# Patient Record
Sex: FEMALE | Race: WHITE
[De-identification: names, ages, dates, MRNs, and addresses within clinical notes are randomized per-mention and may not be internally consistent; named-entity substitution may affect disease eponyms.]

---

## 2019-06-19 ENCOUNTER — HOSPITAL ENCOUNTER (EMERGENCY)
Dept: HOSPITAL 91 - FTE | Age: 39
Discharge: HOME | End: 2019-06-19
Payer: MEDICAID

## 2019-06-19 ENCOUNTER — HOSPITAL ENCOUNTER (EMERGENCY)
Dept: HOSPITAL 10 - FTE | Age: 39
Discharge: HOME | End: 2019-06-19
Payer: MEDICAID

## 2019-06-19 VITALS
HEIGHT: 61 IN | BODY MASS INDEX: 29.93 KG/M2 | HEIGHT: 61 IN | WEIGHT: 158.51 LBS | BODY MASS INDEX: 29.93 KG/M2 | WEIGHT: 158.51 LBS

## 2019-06-19 VITALS — SYSTOLIC BLOOD PRESSURE: 129 MMHG | DIASTOLIC BLOOD PRESSURE: 93 MMHG | RESPIRATION RATE: 18 BRPM | HEART RATE: 91 BPM

## 2019-06-19 DIAGNOSIS — H57.89: Primary | ICD-10-CM

## 2019-06-19 DIAGNOSIS — I10: ICD-10-CM

## 2019-06-19 PROCEDURE — 99282 EMERGENCY DEPT VISIT SF MDM: CPT

## 2019-06-19 NOTE — ERD
ER Documentation


Chief Complaint


Chief Complaint





RIGHT EYE REDNESS; HEADACHE;TINGLING SENSATION ON THE LEFT SIDE OF THE BODY





HPI


39-year-old female with no reported past medical history who presents with 


complaint of right eye redness and left-sided headache.  Patient states she woke


up and noticed sclera via with prominent redness.  Also with left-sided 


headache.  She denies recent trauma to right eye, prominent cough, blurry 


vision, recent URI type symptoms.  Headache is gradual onset without associated 


dizziness, nausea, vomiting, blurry vision.  Time examination patient nontoxic-


appearing with normal triage vital signs.  She otherwise is without complaint.





ROS


All systems reviewed and are negative except as per history of present illness.





Medications


Home Meds


Active Scripts


Ibuprofen* (Motrin*) 600 Mg Tab, 600 MG PO Q6, #30 TAB


   Prov:ELI OTOOLE PA-C         19


Naproxen* (Naprosyn*) 500 Mg Tablet, 500 MG PO BID PRN for PAIN AND/OR 


INFLAMMATION, #30 TAB


   Prov:ELI OTOOLE PA-C         19


Polyvinyl Alcohol/Povidone (Artificial Tears Drops) 15 Ml Drops, 15 ML OP Q2H 


for 10 Days, BOTTLE


   Prov:ELI OTOOLE PA-C         19


Diphenhydramine Hcl* (Benadryl*) 25 Mg Cap, 25 MG PO Q6, #14 CAP


   Prov:ELI OTOOLE PA-C         19


Ondansetron (Ondansetron Odt) 4 Mg Tab.rapdis, 4 MG PO Q6H PRN for NAUSEA AND/OR


VOMITING, #10 TAB


   Prov:SYBIL,LINDA         18


Hydrocodone/Acetaminophen (Norco 5-325 Tablet) 1 Each Tablet, 1 TAB PO Q6H PRN 


for PAIN, #20 TAB


   Prov:SYBIL,LINDA         18


Ondansetron Hcl* (Zofran*) 4 Mg Tablet, 4 MG PO Q6H for NAUSEA AND/OR VOMITING, 


#30 TAB


   Prov:Felicia Walls PA-C         10/13/16


Hydrocodone/Acetaminophen (Norco 5-325 Tablet) 1 Each Tablet, 1 TAB PO Q6H PRN 


for PAIN, #14 TAB


   Prov:Felicia Walls PA-C         10/13/16





Allergies


Allergies:  


Coded Allergies:  


     Penicillins (Verified  Allergy, rash, 13)





PMhx/Soc


History of Surgery:  Yes ( X 4)


Anesthesia Reaction:  No


Hx Neurological Disorder:  No


Hx Respiratory Disorders:  No


Hx Cardiac Disorders:  No (htn)


Hx Psychiatric Problems:  No


Hx Miscellaneous Medical Probl:  Yes (GALLSTONES)


Hx Alcohol Use:  No


Hx Substance Use:  No


Hx Tobacco Use:  No





FmHx


Family History:  No diabetes, No coronary disease, No other





Physical Exam


Vitals





Vital Signs


  Date      Temp  Pulse  Resp  B/P (MAP)   Pulse Ox  O2          O2 Flow    FiO2


Time                                                 Delivery    Rate


   19  99.0     91    18      129/93        99


     16:44                          (105)





Physical Exam


I have reviewed the triage vital signs.


Const: Well nourished, well developed, appears stated age


Eyes: PERRL, no conjunctival injection but with prominent redness of cornea


HENT: NCAT, Neck supple without meningismus 


CV: RRR, Warm, well-perfused extremities


RESP: CTAB, Unlabored respiratory effort


GI: soft, non-tender, non-distended, no masses


MSK: No gross deformities appreciated


Skin: Warm, dry. No rashes


Neuro: grossly non focal


Psych: Appropriate mood and affect.





Procedures/MDM


This patient presents with a headache most consistent with primary type. 


Differential diagnosis includes migraine versus tension type headache. No 


headache red flags. Neurologic exam without evidence of meningismus, focal 


neurologic findings. Presentation not consistent with acute intracranial bleed 


to include SAH (lack of risk factors, headache history). Presentation not 


consistent with acute CNS infection to include meningitis or brain abscess, 


Temporal arteritis unlikely, as is acute angle closure glaucoma given history 


and physical findings. Presentation not consistent with other acute, emergent 


causes of headache at this time. Plan to treat symptomatically with pain 


medication. No indication for imaging/LP at this time.





Right eye symptoms likely secondary to subconjunctival origin likely to resolve 


without further intervention.





Plan: Pain medications, headache treatment, eyedrops





DISPOSITION PLAN:


We discussed follow up with the patient's primary care doctor within 24 to 48 


hours. Patient counseled regarding my diagnostic impression and care plan. Prior


to discharge all questions answered. Pt agrees with treatment plan and 


understands strict return precautions. Precautionary instructions provided 


including instructions to return to the ER if not improving or for any worsening


or changing symptoms or concerns.








Disclaimer: Inadvertent spelling and grammatical errors are likely due to 


EHR/dictation software use and do not reflect on the overall quality of patient 


care. Also, please note that the electronic time recorded on this note does not 


necessarily reflect the actual time of the patient encounter.





Departure


Diagnosis:  


   Primary Impression:  


   Head ache


   Additional Impression:  


   Eye problem


Condition:  Stable


Patient Instructions:  Headache, Unspecified, Subconjunctival Hemorrhage


Referrals:  


[facility]


YOU HAVE RECEIVED A MEDICAL SCREENING EXAM AND THE RESULTS INDICATE THAT YOU DO 


NOT HAVE A CONDITION THAT REQUIRES URGENT TREATMENT IN THE EMERGENCY DEPARTMENT.





FURTHER EVALUATION AND TREATMENT OF YOUR CONDITION CAN WAIT UNTIL YOU ARE SEEN 


IN YOUR DOCTORS OFFICE WITHIN THE NEXT 1-2 DAYS. IT IS YOUR RESPONSIBILITY TO 


MAKE AN APPOINTMENT FOR FOLOW-UP CARE.





IF YOU HAVE A PRIMARY DOCTOR


--you should call your primary doctor and schedule an appointment





IF YOU DO NOT HAVE A PRIMARY DOCTOR YOU CAN CALL OUR PHYSICIAN REFERRAL HOTLINE 


AT


 [phone] 





IF YOU CAN NOT AFFORD TO SEE A PHYSICIAN YOU CAN CHOSE FROM THE FOLLOWING [facility] [phone] [address] [facility]. [facility] [phone] [address] [facility] [facility]. [facility] [phone] [address] [facility] [phone] [address] [facility] [phone] [address] [facility]. [facility]. [phone]


[address]





Additional Instructions:  


Call your primary care doctor TOMORROW for an appointment during the next 2-3 


days.See the doctor sooner or return here if your condition worsens before your 


appointment time.





You develop worsening of your headache or concerning symptoms such as worsening 


blurry vision, upper extremity weakness, nausea, vomiting please return to 


emergency room for further evaluation.











ELI OTOOLE PA-C             2019 18:00